# Patient Record
Sex: MALE | Race: WHITE
[De-identification: names, ages, dates, MRNs, and addresses within clinical notes are randomized per-mention and may not be internally consistent; named-entity substitution may affect disease eponyms.]

---

## 2022-01-04 ENCOUNTER — HOSPITAL ENCOUNTER (EMERGENCY)
Dept: HOSPITAL 56 - MW.ED | Age: 62
Discharge: HOME | End: 2022-01-04
Payer: SELF-PAY

## 2022-01-04 DIAGNOSIS — Z20.822: ICD-10-CM

## 2022-01-04 DIAGNOSIS — I10: Primary | ICD-10-CM

## 2022-01-04 LAB
FLUAV RNA UPPER RESP QL NAA+PROBE: NEGATIVE
FLUBV RNA UPPER RESP QL NAA+PROBE: NEGATIVE
SARS-COV-2 RNA RESP QL NAA+PROBE: NEGATIVE

## 2022-01-04 PROCEDURE — 0240U: CPT

## 2022-01-04 PROCEDURE — 99283 EMERGENCY DEPT VISIT LOW MDM: CPT

## 2022-01-04 NOTE — EDM.PDOC
ED HPI GENERAL MEDICAL PROBLEM





- General


Chief Complaint: Cardiovascular Problem


Stated Complaint: HIGH BLOOD PRESSURE/MEDICAL CLEARANCE


Time Seen by Provider: 01/04/22 13:29


Source of Information: Reports: Patient


History Limitations: Reports: No Limitations





- History of Present Illness


INITIAL COMMENTS - FREE TEXT/NARRATIVE: 





Presents in the custody of law enforcement from the local [facility].  During intake 

at the [facility] facility, the nurse found the patient's blood pressure was 200/100. 

He is currently not on any medications.  Patient states that 3 or 4 years ago he

was on blood pressure medications.  He lost his insurance so he never went back 

to the doctor and did not get back on medication.  He does not recall what 

medication he took.  He has a 40-80-pack-year smoking history.  History of 

alcohol abuse, sober since October.  He has had no symptoms such as headache, 

visual symptoms, dizziness, chest pain, shortness of breath or swelling in his 

ankles.





- Related Data


                                    Allergies











Allergy/AdvReac Type Severity Reaction Status Date / Time


 


No Known Allergies Allergy   Verified 01/04/22 12:53











Home Meds: 


                                    Home Meds





Lisinopril/Hydrochlorothiazide [Lisinopril-Hctz 20-12.5 mg Tab] 1 tab PO DAILY 

#12 tablet 01/04/22 [Rx]











Past Medical History





- Past Health History


Medical/Surgical History: Denies Medical/Surgical History


HEENT History: Reports: Impaired Vision


Cardiovascular History: Reports: Hypertension


Respiratory History: Reports: SOB


Musculoskeletal History: Reports: Arthritis





- Infectious Disease History


Infectious Disease History: Reports: Hepatitis B, Hepatitis C, Other (See Below)


Other Infectious Disease History: "They told me I could not sell blood cause I 

had Hepatitis C or B."





Social & Family History





- Family History


Family Medical History: No Pertinent Family History





- Tobacco Use


Tobacco Use Status *Q: Heavy Tobacco User


Years of Tobacco use: 40


Packs/Tins Daily: 2





- Caffeine Use


Caffeine Use: Reports: Coffee





- Recreational Drug Use


Recreational Drug Use: No





ED ROS GENERAL





- Review of Systems


Review Of Systems: Comprehensive ROS is negative, except as noted in HPI.





ED EXAM, GENERAL





- Physical Exam


Exam: See Below


Exam Limited By: No Limitations


General Appearance: Alert, No Apparent Distress


Ears: Normal External Exam


Nose: Normal Inspection


Throat/Mouth: Normal Inspection


Head: Atraumatic, Normocephalic


Neck: Normal Inspection


Respiratory/Chest: No Respiratory Distress, Lungs Clear, Normal Breath Sounds


Cardiovascular: Normal Peripheral Pulses, Regular Rate, Rhythm, No Murmur


Extremities: Normal Inspection, No Pedal Edema


Neurological: Alert, Oriented


Psychiatric: Normal Affect, Normal Mood


Skin Exam: Warm, Dry, Intact, Normal Color, No Rash


Lymphatic: No Adenopathy





Course





- Vital Signs


Last Recorded V/S: 





                                Last Vital Signs











Temp  36.7 C   01/04/22 12:57


 


Pulse  84   01/04/22 13:40


 


Resp  20   01/04/22 12:57


 


BP  183/97 H  01/04/22 13:40


 


Pulse Ox  92 L  01/04/22 13:40














- Orders/Labs/Meds


Labs: 





                                Laboratory Tests











  01/04/22 Range/Units





  12:55 


 


Influenza Type A RNA  NEGATIVE  (NEGATIVE)  


 


Influenza Type B RNA  NEGATIVE  (NEGATIVE)  


 


SARS-CoV-2 RNA ([facility])  NEGATIVE  (NEGATIVE)  














- Re-Assessments/Exams


Free Text/Narrative Re-Assessment/Exam: 





01/04/22 14:17


Here the patient's blood pressure came down to 184/92.  It was noticed that his 

oxygen saturations are between 90 and 93% but the patient remains asymptomatic. 

 40-80-pack-year smoking history.





Departure





- Departure


Time of Disposition: 14:20


Disposition: Home, Self-Care 01


Condition: Good


Clinical Impression: 


Hypertension


Qualifiers:


 Hypertension type: primary hypertension Qualified Code(s): I10 - Essential 

(primary) hypertension





Referrals: 


PCP,None [Primary Care Provider] - 


[facility] [Outside]


[facility] [Outside]


Additional Instructions: 


The following information is given to patients seen in the emergency department 

who are being discharged to home. This information is to outline your options 

for follow-up care. We provide all patients seen in our emergency department 

with a follow-up referral.





The need for follow-up, as well as the timing and circumstances, are variable 

depending upon the specifics of your emergency department visit.





If you don't have a primary care physician on staff, we will provide you with a 

referral. We always advise you to contact your personal physician following an 

emergency department visit to inform them of the circumstance of the visit and 

for follow-up with them and/or the need for any referrals to a consulting 

specialist.





The emergency department will also refer you to a specialist when appropriate. 

This referral assures that you have the opportunity for follow-up care with a 

specialist. All of these measure are taken in an effort to provide you with 

optimal care, which includes your follow-up.





Under all circumstances we always encourage you to contact your private 

physician who remains a resource for coordinating your care. When calling for 

follow-up care, please make the office aware that this follow-up is from your 

recent emergency room visit. If for any reason you are refused follow-up, please

 contact the [facility] Emergency 

Department at [phone] and asked to speak to the emergency department 

charge nurse.





1.  Take 1 tab of your new blood pressure medication as soon as it is available 

today and then once daily.  Rx to [facility]


2.  Must make an appointment in primary care--not urgent care--as soon as you 

are discharged from the lockup.  You have hypertension and possibly other 

chronic medical problems that require evaluation and treatment.  Call one of the

 clinics listed above.


3.  Stop smoking


4.  Continue your sobriety





Sepsis Event Note (ED)





- Focused Exam


Vital Signs: 





                                   Vital Signs











  Temp Pulse Resp BP Pulse Ox


 


 01/04/22 13:40   84   183/97 H  92 L


 


 01/04/22 12:57  36.7 C  83  20  201/100 H  91 L